# Patient Record
Sex: FEMALE | Race: WHITE | Employment: FULL TIME | URBAN - METROPOLITAN AREA
[De-identification: names, ages, dates, MRNs, and addresses within clinical notes are randomized per-mention and may not be internally consistent; named-entity substitution may affect disease eponyms.]

---

## 2018-11-01 ENCOUNTER — TELEPHONE (OUTPATIENT)
Dept: NEUROLOGY | Facility: CLINIC | Age: 43
End: 2018-11-01

## 2018-11-01 ENCOUNTER — OFFICE VISIT (OUTPATIENT)
Dept: NEUROLOGY | Facility: CLINIC | Age: 43
End: 2018-11-01
Payer: COMMERCIAL

## 2018-11-01 VITALS
BODY MASS INDEX: 32.13 KG/M2 | HEART RATE: 68 BPM | SYSTOLIC BLOOD PRESSURE: 110 MMHG | RESPIRATION RATE: 12 BRPM | DIASTOLIC BLOOD PRESSURE: 74 MMHG | HEIGHT: 68 IN | WEIGHT: 212 LBS

## 2018-11-01 DIAGNOSIS — G43.019 MIGRAINE WITHOUT AURA, INTRACTABLE, WITHOUT STATUS MIGRAINOSUS: Primary | ICD-10-CM

## 2018-11-01 DIAGNOSIS — Z98.890 HISTORY OF CRANIOTOMY: ICD-10-CM

## 2018-11-01 DIAGNOSIS — R29.898 WEAKNESS OF BOTH HANDS: ICD-10-CM

## 2018-11-01 DIAGNOSIS — Z86.018 HISTORY OF MENINGIOMA: ICD-10-CM

## 2018-11-01 PROCEDURE — 99245 OFF/OP CONSLTJ NEW/EST HI 55: CPT | Performed by: OTHER

## 2018-11-01 RX ORDER — METHYLPREDNISOLONE 4 MG/1
TABLET ORAL
Qty: 1 PACKAGE | Refills: 0 | Status: SHIPPED | OUTPATIENT
Start: 2018-11-01 | End: 2019-01-23 | Stop reason: ALTCHOICE

## 2018-11-01 RX ORDER — RIZATRIPTAN BENZOATE 10 MG/1
10 TABLET, ORALLY DISINTEGRATING ORAL AS NEEDED
Qty: 12 TABLET | Refills: 0 | Status: SHIPPED | OUTPATIENT
Start: 2018-11-01 | End: 2018-12-09

## 2018-11-01 RX ORDER — ACETAMINOPHEN AND CODEINE PHOSPHATE 120; 12 MG/5ML; MG/5ML
SOLUTION ORAL
Refills: 3 | COMMUNITY
Start: 2018-09-26 | End: 2018-11-01 | Stop reason: ALTCHOICE

## 2018-11-01 RX ORDER — GABAPENTIN 600 MG/1
TABLET ORAL
Refills: 8 | COMMUNITY
Start: 2018-10-22 | End: 2019-01-23 | Stop reason: ALTCHOICE

## 2018-11-01 RX ORDER — MIRTAZAPINE 15 MG/1
TABLET, FILM COATED ORAL
Refills: 1 | COMMUNITY
Start: 2018-10-29 | End: 2020-01-07

## 2018-11-01 NOTE — PROGRESS NOTES
[address]   Neurology- INITIAL CLINIC VISIT  2018, 10:19 AM     Naya Merida Patient Status:  No patient class for patient encounter    1975 MRN [id number]   Location [address] [place] [address] Warren Sanchez History:  family history includes Diabetes in her father and maternal grandfather; Hypertension in her father. Social History:   reports that  has never smoked. she has never used smokeless tobacco. She reports that she drinks alcohol.  She reports that atrophy. Hearing was grossly intact. Shoulder shrug was normal.   Motor exam revealed normal muscle bulk and tone. No atrophy or fasciculations.  Manual muscle testing revealed MRC grade 5/5 strength throughout including proximal and distal muscles of the a fatigable weakness on exam, also diminished reflexes, differential includes NMJ or neuropathic/demyelilnating process, will check TSH, B12, acetylcholine receptor antibody, SPEP, and EMG is recommended, 3 limb and repetitive nerve stimulation, if negative,

## 2018-11-01 NOTE — H&P
Patient states she is here to try a new MD for migraines. Patient states she has approximately 6 migraines per month.

## 2018-11-01 NOTE — PATIENT INSTRUCTIONS
Refill policies:    • Allow 2-3 business days for refills; controlled substances may take longer.   • Contact your pharmacy at least 5 days prior to running out of medication and have them send an electronic request or submit request through the “request re entire amount billed. Precertification and Prior Authorizations: If your physician has recommended that you have a procedure or additional testing performed.   [facility]) will contact your insurance carrier to obtain pre-certi

## 2018-11-05 ENCOUNTER — TELEPHONE (OUTPATIENT)
Dept: NEUROLOGY | Facility: CLINIC | Age: 43
End: 2018-11-05

## 2018-11-07 NOTE — TELEPHONE ENCOUNTER
Per Dr. Payal Freeman, [facility] ordered accidentally not until after patient left appointment next week. Can you please offer her an EMG in [place] with me? Fridays, Dec 21st 10:20 1st, or as 8am add on (Nov 30th, Dec 7th). \"    PSR notified of message from provider, states will call patient to schedule.

## 2018-11-07 NOTE — TELEPHONE ENCOUNTER
Left message for patient to call back to schedule EMG.  Per Dr. Norma Zamorano, offer her 12/21/18 @ 10:20 first, then 11/30/18 or 12/7/18 @ 8:00am

## 2018-11-20 RX ORDER — AMITRIPTYLINE HYDROCHLORIDE 25 MG/1
25 TABLET, FILM COATED ORAL NIGHTLY
Refills: 0 | Status: CANCELLED | OUTPATIENT
Start: 2018-11-20

## 2018-11-20 RX ORDER — AMITRIPTYLINE HYDROCHLORIDE 25 MG/1
25 TABLET, FILM COATED ORAL NIGHTLY
Qty: 30 TABLET | Refills: 0 | Status: SHIPPED | OUTPATIENT
Start: 2018-11-20 | End: 2018-12-17

## 2018-11-20 NOTE — TELEPHONE ENCOUNTER
Patient called requesting Rx.     Medication: amitriptyline 25 mg    Date of last refill: restarting med    [place]     Last office visit: 11/1/2018  Due back to clinic per last office note:  2 months  Date next office v

## 2018-12-09 DIAGNOSIS — G43.019 INTRACTABLE MIGRAINE WITHOUT AURA AND WITHOUT STATUS MIGRAINOSUS: Primary | ICD-10-CM

## 2018-12-10 RX ORDER — RIZATRIPTAN BENZOATE 10 MG/1
TABLET, ORALLY DISINTEGRATING ORAL
Qty: 12 TABLET | Refills: 0 | Status: SHIPPED | OUTPATIENT
Start: 2018-12-10 | End: 2019-02-17

## 2018-12-10 NOTE — TELEPHONE ENCOUNTER
Medication: RIZATRIPTAN BENZOATE 10 MG Oral Tablet Dispersible    Date of last refill: 11/01/18 (#12/0)  [place]    Last office visit: 11/1/2018  Due back to clinic per last office note:  Around 01/01/19  Date next o

## 2018-12-12 ENCOUNTER — TELEPHONE (OUTPATIENT)
Dept: NEUROLOGY | Facility: CLINIC | Age: 43
End: 2018-12-12

## 2018-12-16 DIAGNOSIS — G43.019 INTRACTABLE MIGRAINE WITHOUT AURA AND WITHOUT STATUS MIGRAINOSUS: Primary | ICD-10-CM

## 2018-12-16 RX ORDER — AMITRIPTYLINE HYDROCHLORIDE 25 MG/1
TABLET, FILM COATED ORAL
Qty: 30 TABLET | Refills: 0 | Status: CANCELLED | OUTPATIENT
Start: 2018-12-16

## 2018-12-17 ENCOUNTER — TELEPHONE (OUTPATIENT)
Dept: NEUROLOGY | Facility: CLINIC | Age: 43
End: 2018-12-17

## 2018-12-17 RX ORDER — AMITRIPTYLINE HYDROCHLORIDE 25 MG/1
50 TABLET, FILM COATED ORAL NIGHTLY
Qty: 30 TABLET | Refills: 0 | COMMUNITY
Start: 2018-12-17 | End: 2018-12-24

## 2018-12-17 NOTE — TELEPHONE ENCOUNTER
S:  Patient's headaches are worsening. Last one lasted 12/13/18-12/16/18. B:  Patient verified she is taking the Amitriptyline 25 mg nightly. A:  Patient has not used OTC pain relievers. States no relief with previous MDP.  Patient does not have a headach

## 2018-12-17 NOTE — TELEPHONE ENCOUNTER
Continue to not use any rescue medications more than 2-3 times per week. We can try increasing Elavil to 50mg. She can take two 25mg tablets, and if tolerates, we can continue that.  Otherwise, another abortive medication may need to be tried, such as topam

## 2018-12-18 ENCOUNTER — TELEPHONE (OUTPATIENT)
Dept: NEUROLOGY | Facility: CLINIC | Age: 43
End: 2018-12-18

## 2018-12-21 ENCOUNTER — TELEPHONE (OUTPATIENT)
Dept: NEUROLOGY | Facility: CLINIC | Age: 43
End: 2018-12-21

## 2018-12-21 NOTE — TELEPHONE ENCOUNTER
Dr asked pt to call back in two weeks to indicate how she's doing on new medication. Pt said she is doing fine on the Amitriptyline. No return call needed.

## 2018-12-24 DIAGNOSIS — G43.019 INTRACTABLE MIGRAINE WITHOUT AURA AND WITHOUT STATUS MIGRAINOSUS: Primary | ICD-10-CM

## 2018-12-24 RX ORDER — AMITRIPTYLINE HYDROCHLORIDE 25 MG/1
50 TABLET, FILM COATED ORAL NIGHTLY
Qty: 30 TABLET | Refills: 0 | Status: SHIPPED | OUTPATIENT
Start: 2018-12-24 | End: 2019-01-03

## 2018-12-24 NOTE — TELEPHONE ENCOUNTER
Medication: Amitriptyline     Date of last refill: historically placed on 12/17/18, was changed to 50mg dosing on TE 12/17/18  [place]    Last office visit: 11/1/18  Due back to clinic per last office note:  2 months

## 2018-12-26 NOTE — TELEPHONE ENCOUNTER
Paperwork is to provide pt with FMLA time when she has a migraine flare. Spoke with patient who states she sometimes misses 2-3 shifts per week due to extreme migraines.     Paperwork initiated and placed in Dr. Miri Thomason folder for review, steven

## 2018-12-27 NOTE — TELEPHONE ENCOUNTER
Copy of paperwork sent to scanning. Copy placed in scanning folder and original placed in accordion file in front office to be picked up at 1/3/2019 office visit.

## 2019-01-03 DIAGNOSIS — G43.019 INTRACTABLE MIGRAINE WITHOUT AURA AND WITHOUT STATUS MIGRAINOSUS: ICD-10-CM

## 2019-01-03 NOTE — TELEPHONE ENCOUNTER
Pt had appt time wrong on 1/3/19 and had to move appt to 1/16/19. Pt requested that we mail her the medical leave form.   Confirmed her mailing address and sent 1/3/19

## 2019-01-04 RX ORDER — AMITRIPTYLINE HYDROCHLORIDE 25 MG/1
TABLET, FILM COATED ORAL
Qty: 30 TABLET | Refills: 0 | Status: SHIPPED | OUTPATIENT
Start: 2019-01-04 | End: 2019-01-20

## 2019-01-04 NOTE — TELEPHONE ENCOUNTER
Medication: AMITRIPTYLINE HCL 25 MG Oral Tab    Date of last refill: 12/24/18 (#30/0)  [place]    Last office visit: 11/01/18  Due back to clinic per last office note:  Around 01/01/19  Date next office visit schedul

## 2019-01-20 DIAGNOSIS — G43.019 INTRACTABLE MIGRAINE WITHOUT AURA AND WITHOUT STATUS MIGRAINOSUS: ICD-10-CM

## 2019-01-21 RX ORDER — AMITRIPTYLINE HYDROCHLORIDE 25 MG/1
TABLET, FILM COATED ORAL
Qty: 30 TABLET | Refills: 0 | Status: SHIPPED | OUTPATIENT
Start: 2019-01-21 | End: 2019-08-14 | Stop reason: ALTCHOICE

## 2019-01-21 NOTE — TELEPHONE ENCOUNTER
Medication: AMITRIPTYLINE HCL 25 MG Oral Tab    Date of last refill: 01/04/19 (#30/0)  [place]    Last office visit: 11/1/2018  Due back to clinic per last office note:  Around 01/01/19  Date next office visit schedu

## 2019-01-23 ENCOUNTER — OFFICE VISIT (OUTPATIENT)
Dept: NEUROLOGY | Facility: CLINIC | Age: 44
End: 2019-01-23
Payer: COMMERCIAL

## 2019-01-23 ENCOUNTER — TELEPHONE (OUTPATIENT)
Dept: NEUROLOGY | Facility: CLINIC | Age: 44
End: 2019-01-23

## 2019-01-23 VITALS
HEART RATE: 98 BPM | BODY MASS INDEX: 34 KG/M2 | SYSTOLIC BLOOD PRESSURE: 118 MMHG | WEIGHT: 223 LBS | DIASTOLIC BLOOD PRESSURE: 86 MMHG | RESPIRATION RATE: 18 BRPM

## 2019-01-23 DIAGNOSIS — R29.898 WEAKNESS OF BOTH HANDS: Primary | ICD-10-CM

## 2019-01-23 DIAGNOSIS — Z86.018 HISTORY OF MENINGIOMA: ICD-10-CM

## 2019-01-23 DIAGNOSIS — G43.019 INTRACTABLE MIGRAINE WITHOUT AURA AND WITHOUT STATUS MIGRAINOSUS: ICD-10-CM

## 2019-01-23 DIAGNOSIS — Z98.890 HISTORY OF CRANIOTOMY: ICD-10-CM

## 2019-01-23 PROCEDURE — 99213 OFFICE O/P EST LOW 20 MIN: CPT | Performed by: OTHER

## 2019-01-23 RX ORDER — AMITRIPTYLINE HYDROCHLORIDE 75 MG/1
75 TABLET, FILM COATED ORAL NIGHTLY
Qty: 30 TABLET | Refills: 1 | Status: SHIPPED | OUTPATIENT
Start: 2019-01-23 | End: 2019-03-20

## 2019-01-23 NOTE — TELEPHONE ENCOUNTER
Rx increased at visit. Pt still had old dosing at pharmacy she had not yet picked up. Called and spoke with pharmacist, Maia Castro, verbalized understanding.     Per Dr. Donis Ruiz, to also remind pt to have blood work that was ordered in November done and to res

## 2019-01-23 NOTE — PROGRESS NOTES
Edil[address]27   Neurology- f/u    Sherl Sol Patient Status:  No patient class for patient encounter    1975 MRN [id number]   Location [facility] [place] MD History:  Past Medical History:   Diagnosis Date   • Anemia    • Brain tumor ([facility] [address]    • Depression    • Migraines         Past Surgical History:  Past Surgical History:   Procedure Laterality Date   • HIP REPLACEMENT SURGERY Right        Family History:  f palate or tongue weakness or atrophy. Hearing was grossly intact. Shoulder shrug was normal.   Motor exam revealed normal muscle bulk and tone. No atrophy or fasciculations.  Manual muscle testing revealed MRC grade 5/5 strength throughout except b/l DI wer treatment. The patient was given ample opportunity to ask questions. All questions and concerns were addressed.      Yovany Davis DO  Neuromuscular and General Neurology  [facility]

## 2019-01-23 NOTE — PATIENT INSTRUCTIONS
After your visit at the [facility] office  today,  please direct any follow up questions or medication needs to the staff in our  [place] office so that your concerns may be promptly addressed.   We are available through [employer] or at the numbers below: Tests:    If your physician has ordered radiology tests such as MRI or CT scans, do not schedule the test until this office has notified you that the test has been approved by your insurer. Depending on your insurance carrier, approval may take 3-10 days. • Failure to follow above steps may result in the delay of form completion.

## 2019-02-06 ENCOUNTER — HOSPITAL ENCOUNTER (OUTPATIENT)
Dept: MRI IMAGING | Facility: HOSPITAL | Age: 44
Discharge: HOME | End: 2019-02-06
Attending: Other
Payer: COMMERCIAL

## 2019-02-06 DIAGNOSIS — R29.898 WEAKNESS OF BOTH HANDS: ICD-10-CM

## 2019-02-06 PROCEDURE — 72141 MRI NECK SPINE W/O DYE: CPT | Performed by: OTHER

## 2019-02-17 DIAGNOSIS — G43.019 INTRACTABLE MIGRAINE WITHOUT AURA AND WITHOUT STATUS MIGRAINOSUS: ICD-10-CM

## 2019-02-18 ENCOUNTER — APPOINTMENT (OUTPATIENT)
Dept: LAB | Age: 44
End: 2019-02-18
Attending: Other
Payer: COMMERCIAL

## 2019-02-18 DIAGNOSIS — R29.898 WEAKNESS OF BOTH HANDS: ICD-10-CM

## 2019-02-18 LAB
TSI SER-ACNC: 1.46 MIU/ML (ref 0.36–3.74)
VIT B12 SERPL-MCNC: 218 PG/ML (ref 193–986)

## 2019-02-18 PROCEDURE — 84238 ASSAY NONENDOCRINE RECEPTOR: CPT

## 2019-02-18 PROCEDURE — 36415 COLL VENOUS BLD VENIPUNCTURE: CPT

## 2019-02-18 PROCEDURE — 82607 VITAMIN B-12: CPT

## 2019-02-18 PROCEDURE — 84443 ASSAY THYROID STIM HORMONE: CPT

## 2019-02-18 PROCEDURE — 83883 ASSAY NEPHELOMETRY NOT SPEC: CPT

## 2019-02-18 PROCEDURE — 83519 RIA NONANTIBODY: CPT

## 2019-02-18 PROCEDURE — 84165 PROTEIN E-PHORESIS SERUM: CPT

## 2019-02-18 PROCEDURE — 86334 IMMUNOFIX E-PHORESIS SERUM: CPT

## 2019-02-18 RX ORDER — RIZATRIPTAN BENZOATE 10 MG/1
TABLET, ORALLY DISINTEGRATING ORAL
Qty: 12 TABLET | Refills: 0 | Status: SHIPPED | OUTPATIENT
Start: 2019-02-18 | End: 2019-07-06

## 2019-02-18 NOTE — TELEPHONE ENCOUNTER
Medication: RIZATRIPTAN BENZOATE 10 MG Oral Tablet Dispersible    Date of last refill: 12/10/18 (#12/0)  [place]    Last office visit: 1/23/2019  Due back to clinic per last office note:  Around 03/23/19  Date next o

## 2019-02-20 LAB
ACETYLCHOLINE BINDING AB: 0 NMOL/L
ACETYLCHOLINE BLOCKING AB: 12 %

## 2019-02-21 LAB
ALBUMIN SERPL-MCNC: 3.8 G/DL (ref 3.1–4.5)
ALBUMIN/GLOB SERPL: 1.23 {RATIO}
ALPHA1 GLOB SERPL ELPH-MCNC: 0.22 G/DL (ref 0.1–0.3)
ALPHA2 GLOB SERPL ELPH-MCNC: 0.87 G/DL (ref 0.6–1)
B-GLOBULIN SERPL ELPH-MCNC: 0.9 G/DL (ref 0.7–1.2)
GAMMA GLOB SERPL ELPH-MCNC: 1.1 G/DL (ref 0.6–1.6)
KAPPA FREE LIGHT CHAIN: 1.23 MG/DL (ref 0.33–1.94)
KAPPA/LAMBDA FLC RATIO: 1.02 (ref 0.26–1.65)
LAMBDA FREE LIGHT CHAIN: 1.21 MG/DL (ref 0.57–2.63)
M-SPIKE 1: 0.38 G/DL (ref ?–0)
MAI PROTEIN SERPL-MCNC: 6.9 G/DL (ref 6.4–8.2)

## 2019-03-20 DIAGNOSIS — G43.019 INTRACTABLE MIGRAINE WITHOUT AURA AND WITHOUT STATUS MIGRAINOSUS: Primary | ICD-10-CM

## 2019-03-20 NOTE — TELEPHONE ENCOUNTER
Medication: AMITRIPTYLINE 75 MG TABLETS    Date of last refill: 01/23/19 (#30/1)  [place]    Last office visit: 01/23/19  Due back to clinic per last office note:  Around 03/23/19  Date next office visit scheduled:

## 2019-03-21 RX ORDER — AMITRIPTYLINE HYDROCHLORIDE 75 MG/1
75 TABLET, FILM COATED ORAL NIGHTLY
Qty: 30 TABLET | Refills: 0 | Status: SHIPPED | OUTPATIENT
Start: 2019-03-21 | End: 2019-04-19

## 2019-04-19 DIAGNOSIS — G43.019 INTRACTABLE MIGRAINE WITHOUT AURA AND WITHOUT STATUS MIGRAINOSUS: ICD-10-CM

## 2019-04-19 NOTE — TELEPHONE ENCOUNTER
LMTCB to schedule f/u vasu't with Dr Yajaira Archuleta before refill can be sent to Dr Yajaira Archuleta for approval.      Medication: AMITRIPTYLINE HCL 75 MG    Date of last refill: 3/21/19 (#30/0)  [place]     Last office visit: 1/23/19

## 2019-04-22 RX ORDER — AMITRIPTYLINE HYDROCHLORIDE 75 MG/1
TABLET, FILM COATED ORAL
Qty: 30 TABLET | Refills: 2 | Status: SHIPPED | OUTPATIENT
Start: 2019-04-22 | End: 2019-08-07

## 2019-04-22 NOTE — TELEPHONE ENCOUNTER
Pt does not have insurance until June and will not be able to schedule an appt until then.  Pt still needs refill on medication. Will route to Dr. Kasey Hammond to see if she is agreeable to refills.     Medication: Amitriptyline     Date of last refill: 03/20/20

## 2019-07-06 DIAGNOSIS — G43.019 INTRACTABLE MIGRAINE WITHOUT AURA AND WITHOUT STATUS MIGRAINOSUS: ICD-10-CM

## 2019-07-08 NOTE — TELEPHONE ENCOUNTER
MLTCB to schedule a f/u vasu't with Dr Iron Koch before refill can be initiated.       Medication: RIZATRIPTAN BENZOATE 10 MG    Date of last refill: 2/18/19 (#12/0)  [place]     Last office visit: 1/23/2019  Due back to cl

## 2019-07-09 RX ORDER — RIZATRIPTAN BENZOATE 10 MG/1
TABLET, ORALLY DISINTEGRATING ORAL
Qty: 12 TABLET | Refills: 0 | Status: SHIPPED | OUTPATIENT
Start: 2019-07-09 | End: 2021-04-20

## 2019-08-07 DIAGNOSIS — G43.019 INTRACTABLE MIGRAINE WITHOUT AURA AND WITHOUT STATUS MIGRAINOSUS: ICD-10-CM

## 2019-08-08 NOTE — TELEPHONE ENCOUNTER
Medication: AMITRIPTYLINE HCL 75 MG Oral Tab    Date of last refill: 04/22/19 (#30/2)  [place]    Last office visit: 01/23/19  Due back to clinic per last office note:  Around 03/23/19  Date next office visit schedul

## 2019-08-14 ENCOUNTER — TELEPHONE (OUTPATIENT)
Dept: NEUROLOGY | Facility: CLINIC | Age: 44
End: 2019-08-14

## 2019-08-14 ENCOUNTER — OFFICE VISIT (OUTPATIENT)
Dept: NEUROLOGY | Facility: CLINIC | Age: 44
End: 2019-08-14
Payer: COMMERCIAL

## 2019-08-14 VITALS
SYSTOLIC BLOOD PRESSURE: 110 MMHG | HEART RATE: 86 BPM | WEIGHT: 239 LBS | BODY MASS INDEX: 36 KG/M2 | RESPIRATION RATE: 16 BRPM | DIASTOLIC BLOOD PRESSURE: 78 MMHG

## 2019-08-14 DIAGNOSIS — E53.8 B12 DEFICIENCY: ICD-10-CM

## 2019-08-14 DIAGNOSIS — R77.8 ABNORMAL SPEP: ICD-10-CM

## 2019-08-14 DIAGNOSIS — Z98.890 HISTORY OF CRANIOTOMY: ICD-10-CM

## 2019-08-14 DIAGNOSIS — R29.898 WEAKNESS OF BOTH HANDS: Primary | ICD-10-CM

## 2019-08-14 DIAGNOSIS — G43.019 INTRACTABLE MIGRAINE WITHOUT AURA AND WITHOUT STATUS MIGRAINOSUS: ICD-10-CM

## 2019-08-14 PROCEDURE — 99214 OFFICE O/P EST MOD 30 MIN: CPT | Performed by: OTHER

## 2019-08-14 RX ORDER — AMITRIPTYLINE HYDROCHLORIDE 75 MG/1
TABLET, FILM COATED ORAL
Qty: 30 TABLET | Refills: 3 | Status: SHIPPED | OUTPATIENT
Start: 2019-08-14 | End: 2021-04-20

## 2019-08-14 RX ORDER — BUTALBITAL, ACETAMINOPHEN AND CAFFEINE 50; 325; 40 MG/1; MG/1; MG/1
CAPSULE ORAL
Qty: 15 CAPSULE | Refills: 0 | Status: SHIPPED | OUTPATIENT
Start: 2019-08-14 | End: 2020-11-23

## 2019-08-14 NOTE — PROGRESS NOTES
Edil[address]to [address]27   Neurology- f/u    Essie Henry Patient Status:  No patient class for patient encounter    1975 MRN [id number]   Location [place] [facility] Romero MD She did not complete the EMG. She still feels her hands are weak. She lifts 30 lb dog food bag bags. She denies any numbness in her hands. Thinks about the same. Reports hands are stiff for an hour in the morning.  She has joint pain in her PIP's and wrists that she has never smoked. She has never used smokeless tobacco. She reports that she drinks alcohol. She reports that she does not use drugs.     Allergies:  No Known Allergies    MEDICATIONS:    Current Outpatient Medications:   •  Butalbital-APAP-Caffein unobtainable knee jerk, and absent ankle jerk. Plantar responses were flexor bilaterally. Sensory exam revealed normal light touch perception. Vibratory perception and proprioception were intact at the toes.  Pinprick and temperature were normal. Romberg opportunity to ask questions. All questions and concerns were addressed. This is a 25 minute visit and greater than 50% of the time was spent counseling the patient and/or coordinating care, and/or reviewing imaging with the patient.       Tye Arellano DO

## 2019-08-14 NOTE — TELEPHONE ENCOUNTER
Pt in office for appt today. Dr. Richelle Juan signed Rx for [phone]. Faxed to pharmacy, confirmation received.

## 2019-08-14 NOTE — PATIENT INSTRUCTIONS
After your visit at the [facility] office  today,  please direct any follow up questions or medication needs to the staff in our  [place] office so that your concerns may be promptly addressed.   We are available through [employer] or at the numbers below: must be picked up in office. • Please allow the office 2-3 business days to fill the prescription. • Patient must present photo ID at time of . PLEASE NOTE: PRESCRIPTIONS MUST BE PICKED UP PRIOR TO 3:00PM MONDAY-FRIDAY    Scheduling Tests:     If submitting forms to office staff. • Form completion may require an additional fee. • A signed Release of Information (MARIE) must be on file before forms may be submitted. When dropping off forms, please ask the  for this paper.    • Failure

## 2019-10-09 DIAGNOSIS — R29.898 WEAKNESS OF BOTH HANDS: Primary | ICD-10-CM

## 2019-10-09 RX ORDER — BUTALBITAL, ACETAMINOPHEN AND CAFFEINE 50; 325; 40 MG/1; MG/1; MG/1
TABLET ORAL
Qty: 15 TABLET | Refills: 1 | Status: SHIPPED | OUTPATIENT
Start: 2019-10-09 | End: 2019-12-26

## 2019-10-09 NOTE — TELEPHONE ENCOUNTER
Medication: BUTALBITAL-APAP-CAFFEINE -40 MG Oral Tab    Date of last refill: 08/14/19 (#15/0)  [place] 57/61/37    Last office visit: 8/14/2019  Due back to clinic per last office note:  Around 10/23/19  Date next

## 2019-10-10 NOTE — TELEPHONE ENCOUNTER
Spoke with Galdino Fulton at pharmacy, called in Rx with read back. Notification sent to pt indicates that Rx needs to be picked up, trgt[employer]us message sent that this is not necessary, Rx was called in.

## 2019-12-26 DIAGNOSIS — R29.898 WEAKNESS OF BOTH HANDS: ICD-10-CM

## 2019-12-26 NOTE — TELEPHONE ENCOUNTER
Medication: BUTALBITAL-APAP-CAFFEINE -40 MG Oral Tab    Date of last refill: 10/09/2019 (#15/1)  [facility] 56/69/1360    Last office visit: 8/14/2019  Due back to clinic per last office note: 10 weeks  Date next [facility]

## 2019-12-27 RX ORDER — BUTALBITAL, ACETAMINOPHEN AND CAFFEINE 50; 325; 40 MG/1; MG/1; MG/1
TABLET ORAL
Qty: 15 TABLET | Refills: 0 | Status: SHIPPED | OUTPATIENT
Start: 2019-12-27 | End: 2020-03-03

## 2020-01-07 ENCOUNTER — OFFICE VISIT (OUTPATIENT)
Dept: NEUROLOGY | Facility: CLINIC | Age: 45
End: 2020-01-07
Payer: COMMERCIAL

## 2020-01-07 VITALS
HEART RATE: 82 BPM | BODY MASS INDEX: 34 KG/M2 | RESPIRATION RATE: 16 BRPM | WEIGHT: 222 LBS | DIASTOLIC BLOOD PRESSURE: 76 MMHG | SYSTOLIC BLOOD PRESSURE: 112 MMHG

## 2020-01-07 DIAGNOSIS — E53.8 B12 DEFICIENCY: ICD-10-CM

## 2020-01-07 DIAGNOSIS — G43.019 MIGRAINE WITHOUT AURA, INTRACTABLE, WITHOUT STATUS MIGRAINOSUS: Primary | ICD-10-CM

## 2020-01-07 DIAGNOSIS — R77.8 ABNORMAL SPEP: ICD-10-CM

## 2020-01-07 DIAGNOSIS — M25.649 STIFFNESS OF HAND JOINT, UNSPECIFIED LATERALITY: ICD-10-CM

## 2020-01-07 PROCEDURE — 99214 OFFICE O/P EST MOD 30 MIN: CPT | Performed by: OTHER

## 2020-01-07 RX ORDER — TOPIRAMATE 25 MG/1
TABLET ORAL
Qty: 120 TABLET | Refills: 0 | Status: SHIPPED | OUTPATIENT
Start: 2020-01-07 | End: 2020-02-10

## 2020-01-07 RX ORDER — AMITRIPTYLINE HYDROCHLORIDE 25 MG/1
TABLET, FILM COATED ORAL
Qty: 1 TABLET | Refills: 1 | Status: SHIPPED | OUTPATIENT
Start: 2020-01-07 | End: 2020-01-09

## 2020-01-07 NOTE — PROGRESS NOTES
Edil[address]to [address]27   Neurology- f/u    Teresa Limb Patient Status:  No patient class for patient encounter    1975 MRN [id number]   Location [facility] PCP Flor Jesus MD She did not complete the EMG. She still feels her hands are weak. She lifts 30 lb dog food bag bags. She denies any numbness in her hands. Thinks about the same. Reports hands are stiff for an hour in the morning.  She has joint pain in her PIP's and wrists 0.358 - 3.740 mIU/mL 1.460   Vitamin B12      193 - 986 pg/mL 218         Past Medical History:  Past Medical History:   Diagnosis Date   • Anemia    • Brain tumor ([facility] [address]    • Depression    • Migraines         Past Surgical History:  Past Surgical History: rhythm  Lungs: Clear to auscultation bilaterally  Skin: There are no rashes or other skin lesions. Musculoskeletal: There is no scoliosis, or joint deformities  Neurologic examination:  Mental status: Patient is alert, attentive, and oriented x 3.  Ermalene Serenityech to 50mg, then 25mg nightly, continue Fiorcet as abortive, not more than 2-3 times a week      History of meningioma and seizure prior to surgery, s/p surgery in 2014      Requested Prescriptions     Signed Prescriptions Disp Refills   • topiramate 25 MG Or

## 2020-01-07 NOTE — PROGRESS NOTES
Pt following up for weakness and migraines. Pt state fiorcet is helping a lot. Pt state she doesn't have as much pain or stiffness in hands anymore.

## 2020-01-07 NOTE — PATIENT INSTRUCTIONS
Refill policies:    • Allow 2-3 business days for refills; controlled substances may take longer.   • Contact your pharmacy at least 5 days prior to running out of medication and have them send an electronic request or submit request through the “request re Depending on your insurance carrier, approval may take 3-10 days. It is highly recommended patients contact their insurance carrier directly to determine coverage.   If test is done without insurance authorization, patient may be responsible for the entire Oral stage - impacted by presence of cervical collar and incomplete dentition, otherwise functional. Pharyngeal stage also WFL with no clinical indicators of aspiration at bedside

## 2020-01-08 DIAGNOSIS — G43.019 INTRACTABLE MIGRAINE WITHOUT AURA AND WITHOUT STATUS MIGRAINOSUS: ICD-10-CM

## 2020-01-08 RX ORDER — AMITRIPTYLINE HYDROCHLORIDE 75 MG/1
TABLET, FILM COATED ORAL
Qty: 30 TABLET | Refills: 3 | OUTPATIENT
Start: 2020-01-08

## 2020-01-08 NOTE — TELEPHONE ENCOUNTER
Refused. The patient medication is decreasing.      Medication: AMITRIPTYLINE HCL 75 MG Oral Tab    Date of last refill: 08/14/19 (#30/3)  [place]    Last office visit: 1/7/2020  Due back to clinic per last office not

## 2020-01-09 RX ORDER — AMITRIPTYLINE HYDROCHLORIDE 25 MG/1
TABLET, FILM COATED ORAL
Qty: 37 TABLET | Refills: 0 | Status: SHIPPED | OUTPATIENT
Start: 2020-01-09 | End: 2021-04-20

## 2020-01-09 NOTE — TELEPHONE ENCOUNTER
Fax from [employer] states only 1 tab was ordered. Will re order rx and pend for provider review.

## 2020-02-09 DIAGNOSIS — G43.019 MIGRAINE WITHOUT AURA, INTRACTABLE, WITHOUT STATUS MIGRAINOSUS: Primary | ICD-10-CM

## 2020-02-10 NOTE — TELEPHONE ENCOUNTER
Medication: TOPIRAMATE 25 MG     Date of last refill: 1/7/20 (#120/0)  [place]    Last office visit: 1/7/2020  Due back to clinic per last office note:  4 months  Date next office visit scheduled:    Future Appointmen

## 2020-02-13 RX ORDER — TOPIRAMATE 25 MG/1
TABLET ORAL
Qty: 120 TABLET | Refills: 0 | Status: SHIPPED | OUTPATIENT
Start: 2020-02-13 | End: 2020-03-24

## 2020-02-21 DIAGNOSIS — G43.019 INTRACTABLE MIGRAINE WITHOUT AURA AND WITHOUT STATUS MIGRAINOSUS: ICD-10-CM

## 2020-02-21 NOTE — TELEPHONE ENCOUNTER
Pt states she is taking 25 mg. She doesn't know if she is supposed to keep taking 25 mg or if she was weaning off completely. Will route to provider for advisement.

## 2020-02-24 RX ORDER — AMITRIPTYLINE HYDROCHLORIDE 25 MG/1
TABLET, FILM COATED ORAL
Qty: 37 TABLET | Refills: 0 | OUTPATIENT
Start: 2020-02-24

## 2020-02-24 NOTE — TELEPHONE ENCOUNTER
Was to overlap with topamax, and then finish the script of Elavil that I gave her. There were 2 tabs, then 1 tab for a week, then should stop, assuming doing ok.

## 2020-03-01 DIAGNOSIS — R29.898 WEAKNESS OF BOTH HANDS: ICD-10-CM

## 2020-03-03 RX ORDER — BUTALBITAL, ACETAMINOPHEN AND CAFFEINE 50; 325; 40 MG/1; MG/1; MG/1
TABLET ORAL
Qty: 15 TABLET | Refills: 0 | Status: SHIPPED | OUTPATIENT
Start: 2020-03-03 | End: 2020-05-05

## 2020-03-03 NOTE — TELEPHONE ENCOUNTER
Medication: Butalbital    Date of last refill: 12/27/19 (#15/0)  [place] 31/28/82    Last office visit: 1/7/2020  Due back to clinic per last office note:  4 months  Date next office visit scheduled:    Future Appointmen

## 2020-03-06 ENCOUNTER — TELEPHONE (OUTPATIENT)
Dept: NEUROLOGY | Facility: CLINIC | Age: 45
End: 2020-03-06

## 2020-03-24 DIAGNOSIS — G43.019 MIGRAINE WITHOUT AURA, INTRACTABLE, WITHOUT STATUS MIGRAINOSUS: ICD-10-CM

## 2020-03-24 NOTE — TELEPHONE ENCOUNTER
Medication: TOPIRAMATE 25 MG TABLETS    Date of last refill: 02/13/2020 (#120/0)  [place]A    Last office visit: 1/7/2020  Due back to clinic per last office note:  Around 05/07/2020  Date next office visit scheduled:

## 2020-03-25 RX ORDER — TOPIRAMATE 25 MG/1
TABLET ORAL
Qty: 120 TABLET | Refills: 2 | Status: SHIPPED | OUTPATIENT
Start: 2020-03-25 | End: 2020-05-22

## 2020-05-05 DIAGNOSIS — R29.898 WEAKNESS OF BOTH HANDS: ICD-10-CM

## 2020-05-05 RX ORDER — BUTALBITAL, ACETAMINOPHEN AND CAFFEINE 50; 325; 40 MG/1; MG/1; MG/1
TABLET ORAL
Qty: 15 TABLET | Refills: 5 | Status: SHIPPED | OUTPATIENT
Start: 2020-05-05 | End: 2021-07-29

## 2020-05-05 NOTE — TELEPHONE ENCOUNTER
Medication: Butalbital     Date of last refill: 12/27/19 (#15/0)  [place] 21/48/80     Last office visit: 1/7/2020  Due back to clinic per last office note:  4 months  Date next office visit scheduled:    5/29/20      La

## 2020-05-22 ENCOUNTER — VIRTUAL PHONE E/M (OUTPATIENT)
Dept: NEUROLOGY | Facility: CLINIC | Age: 45
End: 2020-05-22
Payer: COMMERCIAL

## 2020-05-22 DIAGNOSIS — G43.019 INTRACTABLE MIGRAINE WITHOUT AURA AND WITHOUT STATUS MIGRAINOSUS: Primary | ICD-10-CM

## 2020-05-22 DIAGNOSIS — Z86.018 HISTORY OF MENINGIOMA: ICD-10-CM

## 2020-05-22 PROCEDURE — 99213 OFFICE O/P EST LOW 20 MIN: CPT | Performed by: OTHER

## 2020-05-22 RX ORDER — TOPIRAMATE 50 MG/1
50 TABLET, FILM COATED ORAL 2 TIMES DAILY
Qty: 60 TABLET | Refills: 8 | Status: SHIPPED | OUTPATIENT
Start: 2020-05-22 | End: 2021-03-29

## 2020-05-22 NOTE — PROGRESS NOTES
Virtual Telephone Check-In    Marlon Ron verbally consents to a Virtual/Telephone Check-In visit on 05/22/20. Patient has been referred to the [facility] website at [email or web address] to review the yearly Consent to Treat document.     Patient unders exercising. She is tolerating Elavil. She is taking naproxen or advil 2-3 times a week. Interim  Since last visit, she is here for review of testing. Her B12 level was low. She did not complete the EMG. She still feels her hands are weak.  She lifts 30 lb Monoclonal IgG kappa. If clinically indicated, 24 hour urine monoclonal . . .    KAPPA FREE LIGHT CHAIN      0.330 - 1.940 mg/dL 1.228   LAMBDA FREE LIGHT CHAIN      0.571 - 2.630 mg/dL 1.207   KAPPA/LAMBDA FLC RATIO      0.26 - 1.65 1.02   ACETYLCHOLINE systems was completed.     Pertinent positives and negatives noted in the HPI.          PHYSICAL EXAM:   Neurologic Exam  Vitals  Vitals from 1/7/20 reviewed, specifically blood pressure, heart rate, and weight  Cardiac: reports no pitting when pushes on sk topiramate 50 MG Oral Tab 60 tablet 8     Sig: Take 1 tablet (50 mg total) by mouth 2 (two) times daily. We discussed in depth regarding the diagnosis, prognosis, treatment. The patient was given ample opportunity to ask questions.  All questions a

## 2020-11-23 ENCOUNTER — TELEPHONE (OUTPATIENT)
Dept: NEUROLOGY | Facility: CLINIC | Age: 45
End: 2020-11-23

## 2020-11-23 DIAGNOSIS — G43.019 MIGRAINE WITHOUT AURA, INTRACTABLE, WITHOUT STATUS MIGRAINOSUS: ICD-10-CM

## 2020-11-23 DIAGNOSIS — G43.019 INTRACTABLE MIGRAINE WITHOUT AURA AND WITHOUT STATUS MIGRAINOSUS: Primary | ICD-10-CM

## 2020-11-23 RX ORDER — BUTALBITAL, ACETAMINOPHEN AND CAFFEINE 50; 325; 40 MG/1; MG/1; MG/1
CAPSULE ORAL
Qty: 15 CAPSULE | Refills: 2 | Status: SHIPPED | OUTPATIENT
Start: 2020-11-23 | End: 2021-02-25

## 2020-11-23 NOTE — TELEPHONE ENCOUNTER
Medication: Fioricet    Date oflast refill:5/5/2020   (#15/0))  [place] 35/29/67    Last office visit: 5/22/2020  Due back to clinic per last office note:  9 months  Date next office visit scheduled:  No future appointme

## 2021-02-25 DIAGNOSIS — G43.019 INTRACTABLE MIGRAINE WITHOUT AURA AND WITHOUT STATUS MIGRAINOSUS: ICD-10-CM

## 2021-02-25 DIAGNOSIS — G43.019 MIGRAINE WITHOUT AURA, INTRACTABLE, WITHOUT STATUS MIGRAINOSUS: ICD-10-CM

## 2021-02-25 NOTE — TELEPHONE ENCOUNTER
Medication: BUTALBITAL-APAP-CAFFEINE -40 MG Oral Cap    Date of last refill: 11/23/2020 (#15/2)  [place] 30/65/8506    Last office visit: 05/22/2020  Due back to clinic per last office note:  Around 02/22/2021  Eldon

## 2021-03-02 RX ORDER — BUTALBITAL, ACETAMINOPHEN AND CAFFEINE 50; 325; 40 MG/1; MG/1; MG/1
CAPSULE ORAL
Qty: 15 CAPSULE | Refills: 0 | Status: SHIPPED | OUTPATIENT
Start: 2021-03-02 | End: 2021-04-06

## 2021-03-29 DIAGNOSIS — G43.019 MIGRAINE WITHOUT AURA, INTRACTABLE, WITHOUT STATUS MIGRAINOSUS: Primary | ICD-10-CM

## 2021-03-29 RX ORDER — TOPIRAMATE 50 MG/1
TABLET, FILM COATED ORAL
Qty: 60 TABLET | Refills: 5 | Status: SHIPPED | OUTPATIENT
Start: 2021-03-29 | End: 2021-12-06

## 2021-03-29 NOTE — TELEPHONE ENCOUNTER
Elizabeth't scheduled on 4/20.     Medication: TOPIRAMATE 50 MG    Date of last refill: 5/22/20 (#60/8)  [place]     Last office visit: 5/22/2020  Due back to clinic per last office note:  9 months  Date next office visit sched

## 2021-04-02 DIAGNOSIS — G43.019 MIGRAINE WITHOUT AURA, INTRACTABLE, WITHOUT STATUS MIGRAINOSUS: ICD-10-CM

## 2021-04-02 DIAGNOSIS — G43.019 INTRACTABLE MIGRAINE WITHOUT AURA AND WITHOUT STATUS MIGRAINOSUS: ICD-10-CM

## 2021-04-06 RX ORDER — BUTALBITAL, ACETAMINOPHEN AND CAFFEINE 50; 325; 40 MG/1; MG/1; MG/1
CAPSULE ORAL
Qty: 10 CAPSULE | Refills: 0 | Status: SHIPPED | OUTPATIENT
Start: 2021-04-06 | End: 2021-04-20

## 2021-04-20 ENCOUNTER — OFFICE VISIT (OUTPATIENT)
Dept: NEUROLOGY | Facility: CLINIC | Age: 46
End: 2021-04-20
Payer: COMMERCIAL

## 2021-04-20 VITALS
WEIGHT: 220 LBS | SYSTOLIC BLOOD PRESSURE: 122 MMHG | RESPIRATION RATE: 16 BRPM | BODY MASS INDEX: 33 KG/M2 | DIASTOLIC BLOOD PRESSURE: 70 MMHG | HEART RATE: 72 BPM

## 2021-04-20 DIAGNOSIS — N95.1 PERIMENOPAUSAL: Primary | ICD-10-CM

## 2021-04-20 DIAGNOSIS — G43.019 INTRACTABLE MIGRAINE WITHOUT AURA AND WITHOUT STATUS MIGRAINOSUS: ICD-10-CM

## 2021-04-20 DIAGNOSIS — Z86.018 HISTORY OF MENINGIOMA: ICD-10-CM

## 2021-04-20 PROCEDURE — 99213 OFFICE O/P EST LOW 20 MIN: CPT | Performed by: OTHER

## 2021-04-20 PROCEDURE — 3078F DIAST BP <80 MM HG: CPT | Performed by: OTHER

## 2021-04-20 PROCEDURE — 3074F SYST BP LT 130 MM HG: CPT | Performed by: OTHER

## 2021-04-20 RX ORDER — RIZATRIPTAN BENZOATE 10 MG/1
TABLET, ORALLY DISINTEGRATING ORAL
Qty: 12 TABLET | Refills: 3 | Status: SHIPPED | OUTPATIENT
Start: 2021-04-20

## 2021-04-20 NOTE — PROGRESS NOTES
Edilberto 1827   Neurology- f/u    Liya Connors Patient Status:  No patient class for patient encounter    1975 MRN [id number]   Location [facility] Bharati Cote MD She did not complete the EMG. She still feels her hands are weak. She lifts 30 lb dog food bag bags. She denies any numbness in her hands. Thinks about the same. Reports hands are stiff for an hour in the morning.  She has joint pain in her PIP's and wrists 1.207   KAPPA/LAMBDA FLC RATIO      0.26 - 1.65 1.02   ACETYLCHOLINE BINDING AB      0.0 - 0.4 nmol/L 0.0   ACETYLCHOLINE BLOCKING AB      0 - 26 % 12   TSH      0.358 - 3.740 mIU/mL 1.460   Vitamin B12      193 - 986 pg/mL 218         Past Medical History examination:  Mental status: Patient is alert, attentive, and oriented x 3. Language is coherent and fluent without aphasia. Memory, comprehension and ability to follow commands were intact. Cranial nerves II-XII: Optic discs were sharp.  Pupils were Keira Malick We discussed in depth regarding the diagnosis, prognosis, treatment. The patient was given ample opportunity to ask questions. All questions and concerns were addressed.        Aleksander Romero DO  Neuromuscular and General Neurology  [facility]

## 2021-04-21 NOTE — TELEPHONE ENCOUNTER
----- Message from Matilda Councilman sent at 4/21/2021 10:27 AM EDT -----  Subject: Message to Provider    QUESTIONS  Information for Provider? Patient received her after visit paper work and   would like to know when she needs to be seen next.   ---------------------------------------------------------------------------  --------------  [address]  What is the best way for the office to contact you? OK to leave message on   voicemail  Preferred Call Back Phone Number? [phone]  ---------------------------------------------------------------------------  --------------  SCRIPT ANSWERS  Relationship to Patient?  Self Patient informed of below. Patient will call back if tolerating increased dose of amitriptyline so new RX can be sent.

## 2021-04-26 ENCOUNTER — HOSPITAL ENCOUNTER (OUTPATIENT)
Dept: MRI IMAGING | Age: 46
Discharge: HOME | End: 2021-04-26
Attending: Other
Payer: COMMERCIAL

## 2021-04-26 DIAGNOSIS — Z86.018 HISTORY OF MENINGIOMA: ICD-10-CM

## 2021-04-26 PROCEDURE — 70553 MRI BRAIN STEM W/O & W/DYE: CPT | Performed by: OTHER

## 2021-04-26 PROCEDURE — A9575 INJ GADOTERATE MEGLUMI 0.1ML: HCPCS | Performed by: OTHER

## 2021-07-28 DIAGNOSIS — R29.898 WEAKNESS OF BOTH HANDS: ICD-10-CM

## 2021-07-28 NOTE — TELEPHONE ENCOUNTER
Medication: fioricet    Date of last refill: 5/5/20 (#15/5)  [place] 1/2/67    Last office visit: 4/20/21  Due back to clinic per last office note:  1 year  Date next office visit scheduled:  No future appointments.     L

## 2021-07-29 RX ORDER — BUTALBITAL, ACETAMINOPHEN AND CAFFEINE 50; 325; 40 MG/1; MG/1; MG/1
TABLET ORAL
Qty: 15 TABLET | Refills: 5 | Status: SHIPPED | OUTPATIENT
Start: 2021-07-29 | End: 2021-08-03

## 2021-08-03 RX ORDER — BUTALBITAL, ACETAMINOPHEN AND CAFFEINE 50; 325; 40 MG/1; MG/1; MG/1
TABLET ORAL
Qty: 15 TABLET | Refills: 0 | Status: SHIPPED | OUTPATIENT
Start: 2021-08-03 | End: 2021-09-01

## 2021-08-03 NOTE — TELEPHONE ENCOUNTER
Pt calling, [facility] told her that because they filled the first script, they can't send it over to the other [facility], they need a new script number.

## 2021-08-03 NOTE — TELEPHONE ENCOUNTER
Pt is out of town and needs this perscription sent to [place] in [place]. Herson Olivera.   Phone -[phone]

## 2021-09-01 DIAGNOSIS — R29.898 WEAKNESS OF BOTH HANDS: ICD-10-CM

## 2021-09-01 RX ORDER — BUTALBITAL, ACETAMINOPHEN AND CAFFEINE 50; 325; 40 MG/1; MG/1; MG/1
TABLET ORAL
Qty: 15 TABLET | Refills: 0 | Status: SHIPPED | OUTPATIENT
Start: 2021-09-01 | End: 2021-10-03

## 2021-09-01 NOTE — TELEPHONE ENCOUNTER
Medication: BUTALBITAL-ACETAMINOPHEN-CAFFEINE -40 MG Oral Tab    Date of last refill: 08/03/2021 (#15/0)  [place] 65/60/3306    Last office visit: 04/20/2021  Due back to clinic per last office note:  Around 04/20/

## 2021-10-01 DIAGNOSIS — R29.898 WEAKNESS OF BOTH HANDS: ICD-10-CM

## 2021-10-01 NOTE — TELEPHONE ENCOUNTER
Medication: butalbital-acetaminophen-caffeine -40 MG Oral Tab    Date of last refill: 9/1/2021 (#15/0)  [place] n[place]a    Last office visit: 4/20/2021  Due back to clinic per last office note:  1 year  Date next offic

## 2021-10-03 RX ORDER — BUTALBITAL, ACETAMINOPHEN AND CAFFEINE 50; 325; 40 MG/1; MG/1; MG/1
TABLET ORAL
Qty: 15 TABLET | Refills: 0 | Status: SHIPPED | OUTPATIENT
Start: 2021-10-03 | End: 2021-11-09

## 2021-11-08 DIAGNOSIS — R29.898 WEAKNESS OF BOTH HANDS: ICD-10-CM

## 2021-11-09 RX ORDER — BUTALBITAL, ACETAMINOPHEN AND CAFFEINE 50; 325; 40 MG/1; MG/1; MG/1
TABLET ORAL
Qty: 15 TABLET | Refills: 0 | Status: SHIPPED | OUTPATIENT
Start: 2021-11-09 | End: 2021-12-07

## 2021-11-09 NOTE — TELEPHONE ENCOUNTER
Medication: BUTALBITAL-ACETAMINOPHEN-CAFFEINE -40 MG Oral Tab     Date of last refill: 10/03/2021 (#15/0)  [place] N[place]A     Last office visit: 04/20/2021  Due back to clinic per last office note:  Around 04/20/2022

## 2021-12-05 DIAGNOSIS — G43.019 MIGRAINE WITHOUT AURA, INTRACTABLE, WITHOUT STATUS MIGRAINOSUS: ICD-10-CM

## 2021-12-06 DIAGNOSIS — R29.898 WEAKNESS OF BOTH HANDS: ICD-10-CM

## 2021-12-06 NOTE — TELEPHONE ENCOUNTER
Medication: TOPIRAMATE 50 MG Oral Tab     Date of last refill: 03/29/2021 (#60/5)  [place]     Last office visit: 04/20/2021  Due back to clinic per last office note:  Around 04/20/2022  Date next office visit schedu

## 2021-12-06 NOTE — TELEPHONE ENCOUNTER
Medication: BUTALBITAL-ACETAMINOPHEN-CAFFEINE -40 MG Oral Tab     Date of last refill: 11/09/2021 (#15/0)  [place]A     Last office visit: 04/20/2021  Due back to clinic per last office note:  Around 04/20/2022

## 2021-12-07 RX ORDER — BUTALBITAL, ACETAMINOPHEN AND CAFFEINE 50; 325; 40 MG/1; MG/1; MG/1
TABLET ORAL
Qty: 15 TABLET | Refills: 0 | Status: SHIPPED | OUTPATIENT
Start: 2021-12-07 | End: 2022-01-11

## 2021-12-07 RX ORDER — TOPIRAMATE 50 MG/1
TABLET, FILM COATED ORAL
Qty: 60 TABLET | Refills: 5 | Status: SHIPPED | OUTPATIENT
Start: 2021-12-07

## 2022-01-11 DIAGNOSIS — R29.898 WEAKNESS OF BOTH HANDS: ICD-10-CM

## 2022-01-11 RX ORDER — BUTALBITAL, ACETAMINOPHEN AND CAFFEINE 50; 325; 40 MG/1; MG/1; MG/1
TABLET ORAL
Qty: 15 TABLET | Refills: 0 | Status: SHIPPED | OUTPATIENT
Start: 2022-01-11

## 2022-01-11 NOTE — TELEPHONE ENCOUNTER
Medication: BUTALBITAL-ACETAMINOPHEN-CAFFEINE -40 MG Oral Tab     Date of last refill: 12/07/2021 (#15/0)  [place]A     Last office visit: 04/20/2021  Due back to clinic per last office note:  Around 04/20/2022

## 2022-04-01 RX ORDER — BUTALBITAL, ACETAMINOPHEN AND CAFFEINE 50; 325; 40 MG/1; MG/1; MG/1
1 TABLET ORAL AS NEEDED
Qty: 15 TABLET | Refills: 0 | Status: SHIPPED | OUTPATIENT
Start: 2022-04-01

## 2022-04-01 NOTE — TELEPHONE ENCOUNTER
Pt requesting refill on Butalbital - Acetaminophen. She has recently moved to [place] and will be establishing care with a neurologist there in the next few months. Please sent prescription to [facility] in [place].

## 2022-06-30 ENCOUNTER — TELEPHONE (OUTPATIENT)
Dept: NEUROLOGY | Facility: CLINIC | Age: 47
End: 2022-06-30

## 2022-06-30 DIAGNOSIS — G43.019 MIGRAINE WITHOUT AURA, INTRACTABLE, WITHOUT STATUS MIGRAINOSUS: ICD-10-CM

## 2022-06-30 RX ORDER — TOPIRAMATE 50 MG/1
50 TABLET, FILM COATED ORAL 2 TIMES DAILY
Qty: 60 TABLET | Refills: 5 | OUTPATIENT
Start: 2022-06-30

## 2022-07-05 DIAGNOSIS — G43.019 MIGRAINE WITHOUT AURA, INTRACTABLE, WITHOUT STATUS MIGRAINOSUS: ICD-10-CM

## 2022-07-05 DIAGNOSIS — R29.898 WEAKNESS OF BOTH HANDS: ICD-10-CM

## 2022-07-05 RX ORDER — BUTALBITAL, ACETAMINOPHEN AND CAFFEINE 50; 325; 40 MG/1; MG/1; MG/1
1 TABLET ORAL AS NEEDED
Qty: 15 TABLET | Refills: 0 | OUTPATIENT
Start: 2022-07-05

## 2022-07-05 RX ORDER — TOPIRAMATE 50 MG/1
50 TABLET, FILM COATED ORAL 2 TIMES DAILY
Qty: 60 TABLET | Refills: 0 | Status: SHIPPED | OUTPATIENT
Start: 2022-07-05

## 2022-07-05 NOTE — TELEPHONE ENCOUNTER
pt is requesting a refill for the butalbital-acetaminophen-caffeine -40 MG Oral Tab and the TOPIRAMATE 50 MG Oral Tab, sent to the [address] [facility], [phone], [phone]

## 2022-08-10 DIAGNOSIS — R29.898 WEAKNESS OF BOTH HANDS: ICD-10-CM

## 2022-08-11 DIAGNOSIS — G43.019 MIGRAINE WITHOUT AURA, INTRACTABLE, WITHOUT STATUS MIGRAINOSUS: ICD-10-CM

## 2022-08-12 DIAGNOSIS — R29.898 WEAKNESS OF BOTH HANDS: ICD-10-CM

## 2022-08-12 RX ORDER — TOPIRAMATE 50 MG/1
50 TABLET, FILM COATED ORAL 2 TIMES DAILY
Qty: 60 TABLET | Refills: 0 | Status: SHIPPED | OUTPATIENT
Start: 2022-08-12

## 2022-08-15 ENCOUNTER — TELEPHONE (OUTPATIENT)
Dept: NEUROLOGY | Facility: CLINIC | Age: 47
End: 2022-08-15

## 2022-08-15 RX ORDER — BUTALBITAL, ACETAMINOPHEN AND CAFFEINE 50; 325; 40 MG/1; MG/1; MG/1
1 TABLET ORAL AS NEEDED
Qty: 15 TABLET | Refills: 0 | Status: SHIPPED | OUTPATIENT
Start: 2022-08-15

## 2022-08-15 RX ORDER — BUTALBITAL, ACETAMINOPHEN AND CAFFEINE 50; 325; 40 MG/1; MG/1; MG/1
1 TABLET ORAL AS NEEDED
Qty: 15 TABLET | Refills: 0 | OUTPATIENT
Start: 2022-08-15

## 2022-08-16 RX ORDER — BUTALBITAL, ACETAMINOPHEN AND CAFFEINE 50; 325; 40 MG/1; MG/1; MG/1
1 TABLET ORAL AS NEEDED
Qty: 15 TABLET | Refills: 0 | Status: SHIPPED | OUTPATIENT
Start: 2022-08-16 | End: 2022-08-17

## 2022-08-16 NOTE — TELEPHONE ENCOUNTER
Called to pharmacy to place order over phone. Per [place] requirements medication must be prescribed electronically. Every time approved it will kick back printed. Will try again. Pharmacy advised if kicked back call and they will try to over-ride.

## 2022-08-16 NOTE — TELEPHONE ENCOUNTER
Pt calling to request that medication be sent to pharmacy electronically. She states it was sent by fax and they will only accept the order electronically.

## 2022-09-16 DIAGNOSIS — G43.019 MIGRAINE WITHOUT AURA, INTRACTABLE, WITHOUT STATUS MIGRAINOSUS: ICD-10-CM

## 2022-09-16 RX ORDER — TOPIRAMATE 50 MG/1
50 TABLET, FILM COATED ORAL 2 TIMES DAILY
Qty: 60 TABLET | Refills: 1 | Status: SHIPPED | OUTPATIENT
Start: 2022-09-16

## 2022-11-01 ENCOUNTER — TELEPHONE (OUTPATIENT)
Dept: SURGERY | Facility: CLINIC | Age: 47
End: 2022-11-01

## 2022-11-01 DIAGNOSIS — G43.019 INTRACTABLE MIGRAINE WITHOUT AURA AND WITHOUT STATUS MIGRAINOSUS: Primary | ICD-10-CM

## 2022-11-01 NOTE — TELEPHONE ENCOUNTER
Pt was able to find a neurologist in [place] but they are asking for a referral from her current neurologist; pt is self pay and has a appt scheduled as of right now; pt will not cancel appt until she is sure she will get appt in [place]; pt would like it faxed to [facility] [phone]

## 2022-11-01 NOTE — TELEPHONE ENCOUNTER
Placed referral and faxed to [facility] at [phone] per patient request. Received fax confirmation back. Patient advised and verbalized understanding. Upcoming appointment canceled.

## (undated) DIAGNOSIS — R29.898 WEAKNESS OF BOTH HANDS: ICD-10-CM

## (undated) NOTE — LETTER
12/12/18      [address]      Dear Ji Johnson,     We are contacting you from Dr. [employer] office. Your health is important to us.  We have not received test results for additional tests that your provider recomme